# Patient Record
Sex: MALE
[De-identification: names, ages, dates, MRNs, and addresses within clinical notes are randomized per-mention and may not be internally consistent; named-entity substitution may affect disease eponyms.]

---

## 2017-06-11 ENCOUNTER — HOSPITAL (OUTPATIENT)
Dept: OTHER | Age: 22
End: 2017-06-11
Attending: EMERGENCY MEDICINE

## 2017-06-11 LAB
ALBUMIN SERPL-MCNC: 3.7 GM/DL (ref 3.6–5.1)
ALP SERPL-CCNC: 66 UNIT/L (ref 45–117)
ALT SERPL-CCNC: 24 UNIT/L
ANALYZER ANC (IANC): ABNORMAL
ANION GAP SERPL CALC-SCNC: 14 MMOL/L (ref 10–20)
AST SERPL-CCNC: 26 UNIT/L
BASOPHILS # BLD: 0 THOUSAND/MCL (ref 0–0.3)
BASOPHILS NFR BLD: 0 %
BILIRUB CONJ SERPL-MCNC: 0.1 MG/DL (ref 0–0.2)
BILIRUB SERPL-MCNC: 0.6 MG/DL (ref 0.2–1)
BUN SERPL-MCNC: 18 MG/DL (ref 6–20)
BUN/CREAT SERPL: 16 (ref 7–25)
CALCIUM SERPL-MCNC: 8.7 MG/DL (ref 8.4–10.2)
CHLORIDE: 106 MMOL/L (ref 98–107)
CO2 SERPL-SCNC: 27 MMOL/L (ref 21–32)
CREAT SERPL-MCNC: 1.11 MG/DL (ref 0.67–1.17)
DIFFERENTIAL METHOD BLD: ABNORMAL
EOSINOPHIL # BLD: 0.2 THOUSAND/MCL (ref 0.1–0.5)
EOSINOPHIL NFR BLD: 2 %
ERYTHROCYTE [DISTWIDTH] IN BLOOD: 12.9 % (ref 11–15)
ETHANOL SERPL-MCNC: NORMAL MG/DL
GLUCOSE SERPL-MCNC: 92 MG/DL (ref 65–99)
HEMATOCRIT: 40.5 % (ref 39–51)
HGB BLD-MCNC: 13.6 GM/DL (ref 13–17)
LIPASE SERPL-CCNC: 84 UNIT/L (ref 73–393)
LYMPHOCYTES # BLD: 3.9 THOUSAND/MCL (ref 1–4.8)
LYMPHOCYTES NFR BLD: 41 %
MAGNESIUM SERPL-MCNC: 1.8 MG/DL (ref 1.7–2.4)
MCH RBC QN AUTO: 30.5 PG (ref 26–34)
MCHC RBC AUTO-ENTMCNC: 33.6 GM/DL (ref 32–36.5)
MCV RBC AUTO: 90.8 FL (ref 78–100)
MONOCYTES # BLD: 0.8 THOUSAND/MCL (ref 0.3–0.9)
MONOCYTES NFR BLD: 8 %
NEUTROPHILS # BLD: 4.7 THOUSAND/MCL (ref 1.8–7.7)
NEUTROPHILS NFR BLD: 49 %
NEUTS SEG NFR BLD: ABNORMAL %
PERCENT NRBC: ABNORMAL
PLATELET # BLD: 240 THOUSAND/MCL (ref 140–450)
POTASSIUM SERPL-SCNC: 3.8 MMOL/L (ref 3.4–5.1)
PROT SERPL-MCNC: 6.9 GM/DL (ref 6.4–8.2)
RBC # BLD: 4.46 MILLION/MCL (ref 4.5–5.9)
SODIUM SERPL-SCNC: 143 MMOL/L (ref 135–145)
WBC # BLD: 9.6 THOUSAND/MCL (ref 4.2–11)

## 2020-01-11 ENCOUNTER — HOSPITAL ENCOUNTER (INPATIENT)
Facility: HOSPITAL | Age: 25
LOS: 1 days | Discharge: HOME | DRG: 540 | End: 2020-01-12
Attending: EMERGENCY MEDICINE | Admitting: HOSPITALIST

## 2020-01-11 DIAGNOSIS — M46.24 ACUTE OSTEOMYELITIS OF THORACIC SPINE (HCC): ICD-10-CM

## 2020-01-11 DIAGNOSIS — A49.01 STAPH AUREUS INFECTION: ICD-10-CM

## 2020-01-11 DIAGNOSIS — M46.22 ACUTE OSTEOMYELITIS OF CERVICAL SPINE (HCC): Primary | ICD-10-CM

## 2020-01-11 LAB
ALBUMIN SERPL-MCNC: 4.3 G/DL (ref 3.4–5)
ALBUMIN/GLOB SERPL: 1.2 {RATIO} (ref 1–2)
ALP LIVER SERPL-CCNC: 70 U/L (ref 45–117)
ALT SERPL-CCNC: 16 U/L (ref 16–61)
ANION GAP SERPL CALC-SCNC: 5 MMOL/L (ref 0–18)
AST SERPL-CCNC: 15 U/L (ref 15–37)
BASOPHILS # BLD AUTO: 0.04 X10(3) UL (ref 0–0.2)
BASOPHILS NFR BLD AUTO: 0.5 %
BILIRUB SERPL-MCNC: 0.7 MG/DL (ref 0.1–2)
BUN BLD-MCNC: 17 MG/DL (ref 7–18)
BUN/CREAT SERPL: 17 (ref 10–20)
CALCIUM BLD-MCNC: 9 MG/DL (ref 8.5–10.1)
CHLORIDE SERPL-SCNC: 111 MMOL/L (ref 98–112)
CO2 SERPL-SCNC: 25 MMOL/L (ref 21–32)
CREAT BLD-MCNC: 1 MG/DL (ref 0.7–1.3)
CRP SERPL-MCNC: <0.29 MG/DL (ref ?–0.3)
DEPRECATED RDW RBC AUTO: 44.7 FL (ref 35.1–46.3)
EOSINOPHIL # BLD AUTO: 0.03 X10(3) UL (ref 0–0.7)
EOSINOPHIL NFR BLD AUTO: 0.4 %
ERYTHROCYTE [DISTWIDTH] IN BLOOD BY AUTOMATED COUNT: 13.3 % (ref 11–15)
GLOBULIN PLAS-MCNC: 3.7 G/DL (ref 2.8–4.4)
GLUCOSE BLD-MCNC: 96 MG/DL (ref 70–99)
HCT VFR BLD AUTO: 43.6 % (ref 39–53)
HGB BLD-MCNC: 14.3 G/DL (ref 13–17.5)
IMM GRANULOCYTES # BLD AUTO: 0.02 X10(3) UL (ref 0–1)
IMM GRANULOCYTES NFR BLD: 0.2 %
LYMPHOCYTES # BLD AUTO: 1.89 X10(3) UL (ref 1–4)
LYMPHOCYTES NFR BLD AUTO: 22.9 %
M PROTEIN MFR SERPL ELPH: 8 G/DL (ref 6.4–8.2)
MCH RBC QN AUTO: 29.9 PG (ref 26–34)
MCHC RBC AUTO-ENTMCNC: 32.8 G/DL (ref 31–37)
MCV RBC AUTO: 91.2 FL (ref 80–100)
MONOCYTES # BLD AUTO: 0.6 X10(3) UL (ref 0.1–1)
MONOCYTES NFR BLD AUTO: 7.3 %
NEUTROPHILS # BLD AUTO: 5.69 X10 (3) UL (ref 1.5–7.7)
NEUTROPHILS # BLD AUTO: 5.69 X10(3) UL (ref 1.5–7.7)
NEUTROPHILS NFR BLD AUTO: 68.7 %
OSMOLALITY SERPL CALC.SUM OF ELEC: 293 MOSM/KG (ref 275–295)
PLATELET # BLD AUTO: 273 10(3)UL (ref 150–450)
POTASSIUM SERPL-SCNC: 4.1 MMOL/L (ref 3.5–5.1)
RBC # BLD AUTO: 4.78 X10(6)UL (ref 4.3–5.7)
SED RATE-ML: 3 MM/HR (ref 0–12)
SODIUM SERPL-SCNC: 141 MMOL/L (ref 136–145)
WBC # BLD AUTO: 8.3 X10(3) UL (ref 4–11)

## 2020-01-11 PROCEDURE — 99222 1ST HOSP IP/OBS MODERATE 55: CPT | Performed by: HOSPITALIST

## 2020-01-11 RX ORDER — ONDANSETRON 2 MG/ML
4 INJECTION INTRAMUSCULAR; INTRAVENOUS EVERY 6 HOURS PRN
Status: DISCONTINUED | OUTPATIENT
Start: 2020-01-11 | End: 2020-01-12

## 2020-01-11 RX ORDER — ENOXAPARIN SODIUM 100 MG/ML
40 INJECTION SUBCUTANEOUS DAILY
Status: DISCONTINUED | OUTPATIENT
Start: 2020-01-11 | End: 2020-01-12

## 2020-01-11 RX ORDER — LORAZEPAM 0.5 MG/1
0.5 TABLET ORAL EVERY 4 HOURS PRN
Status: DISCONTINUED | OUTPATIENT
Start: 2020-01-11 | End: 2020-01-12

## 2020-01-11 RX ORDER — METOCLOPRAMIDE HYDROCHLORIDE 5 MG/ML
5 INJECTION INTRAMUSCULAR; INTRAVENOUS EVERY 8 HOURS PRN
Status: DISCONTINUED | OUTPATIENT
Start: 2020-01-11 | End: 2020-01-12

## 2020-01-11 RX ORDER — CEFAZOLIN SODIUM/WATER 2 G/20 ML
2 SYRINGE (ML) INTRAVENOUS EVERY 8 HOURS
Status: DISCONTINUED | OUTPATIENT
Start: 2020-01-11 | End: 2020-01-12

## 2020-01-11 RX ORDER — ACETAMINOPHEN 325 MG/1
650 TABLET ORAL EVERY 6 HOURS PRN
Status: DISCONTINUED | OUTPATIENT
Start: 2020-01-11 | End: 2020-01-12

## 2020-01-11 NOTE — CM/SW NOTE
30year old male with a past medical history of IVDU with liquid cocaine. He was in [facility] due to what started as a right arm cellulitis but progressed to bacteremia/sepsis. He was initially placed on Ancef.   He was found to have spinal osteo and was ch

## 2020-01-11 NOTE — PLAN OF CARE
Vitals stable, afebrile, declines pain medication, scar/keloid to [facility], up ad luis, refuses SCD/Lovenox - Dr. Jesenia Weiner aware.       Plan:  Awaiting records from previous hospital (see previous note), possible d/c with IV Abx (Dalbavancin) - weekly infusions

## 2020-01-11 NOTE — CONSULTS
INFECTIOUS DISEASE CONSULTATION    Charlene Celso Patient Status:  Inpatient    1995 MRN [id number]   [facility] 3SW-A Attending Ronaldo Barrett MD   Hosp Day # 0 PCP None Pc g, 2 g, Intravenous, Q8H  •  Enoxaparin Sodium (LOVENOX) 40 MG/0.4ML injection 40 mg, 40 mg, Subcutaneous, Daily  No current facility-administered medications on file prior to encounter. No current outpatient medications on file prior to encounter. is normal cw resolving infection    -would favor completing full 8 weeks of abx  Will dw sw if can discharge on once weekly dalbavancin, since he is not a candidate for PICC Line  -await outside records        Lacy Fleming MD  [address]

## 2020-01-11 NOTE — ED INITIAL ASSESSMENT (HPI)
Pt presents to ED with complaint of back and neck pain. Pt reports he is being treated in [facility] for an aggressive staph infection \"turned to blood and bone infection. \" Pt reports he has been receiving IV antibiotics in the hospital for the last 5 weeks

## 2020-01-11 NOTE — PLAN OF CARE
Rates posterior cervical pain at 5/10, and declined Tylenol. Denies N/T to BUE. VSS. Denies nausea or chills. Up ad luis. Tolerating PO intake. Updated on POC. Call light within reach. Will continue to monitor.

## 2020-01-11 NOTE — ED PROVIDER NOTES
Patient Seen in: [facility] Emergency Department      History   Patient presents with:  Back Pain    Stated Complaint:     HPI  24-year-old male with a history of anxiety, history of IV drug abuse, history of methicillin sensitive staph infection, p Review of Systems    Positive for stated complaint:   Other systems are as noted in HPI. Constitutional and vital signs reviewed. All other systems reviewed and negative except as noted above.     Physical Exam     ED Triage Vitals [01/11/20 015 DIFFERENTIAL WITH PLATELET.   Procedure                               Abnormality         Status                     ---------                               -----------         ------                     CBC W/ DIFFERENTIAL[[id number]] infection    Disposition:  Admit  1/11/2020  2:50 am    Follow-up:  No follow-up provider specified. Medications Prescribed:  There are no discharge medications for this patient.                  Present on Admission  Date Reviewed: 12/12/2011

## 2020-01-11 NOTE — PROGRESS NOTES
NURSING ADMISSION NOTE      Patient admitted via cart from ED. Oriented to room. Safety precautions initiated. Bed in low position. Call light in reach.   Voided upon arrival.

## 2020-01-11 NOTE — PLAN OF CARE
[address] in hospitals, spoke with Chelsea Maldonado ([phone]) in Medical Records, she stated that they aren't usually open on weekends, but they are today and if I fax the request today she will fax the records to us.   Faxed th

## 2020-01-11 NOTE — H&P
[facility]  History and Physical     Reynold Verdin Patient Status:  Emergency    1995 MRN [id number]   Location [address] Attending Britta Castro MD   Hosp Day # 0 PCP None Pcp     Chief Complaint: hx of review of systems was completed. Pertinent positives and negatives noted in the HPI.     Physical Exam:    /79   Pulse 75   Temp 97.8 °F (36.6 °C) (Temporal)   Resp 18   Ht 5' 9\" (1.753 m)   Wt 165 lb (74.8 kg)   SpO2 98%   BMI 24.37 kg/m²   Gener Prophylaxis: lovenox  · CODE status: full  · [place]: no    Plan of care discussed with ED physician    Jayro Giraldo MD  1/11/2020

## 2020-01-11 NOTE — PROGRESS NOTES
IM addendum to Dr. Karrie Stewart H&P:    Pt seen and examined. No acute events, no new complaints.     /68 (BP Location: Right arm)   Pulse 75   Temp 97.7 °F (36.5 °C) (Oral)   Resp 20   Ht 5' 9\" (1.753 m)   Wt 165 lb (74.8 kg)   SpO2 97%   BMI 24.37 kg/

## 2020-01-11 NOTE — CM/SW NOTE
Patient will need close f/u with CM once needs are determined. Change healthcare to see patient for medicaid application. Pt. will likely need outpatient IV abx. Awaiting plan from ID.

## 2020-01-12 VITALS
RESPIRATION RATE: 16 BRPM | TEMPERATURE: 98 F | HEIGHT: 69 IN | HEART RATE: 87 BPM | WEIGHT: 165 LBS | DIASTOLIC BLOOD PRESSURE: 61 MMHG | BODY MASS INDEX: 24.44 KG/M2 | OXYGEN SATURATION: 97 % | SYSTOLIC BLOOD PRESSURE: 117 MMHG

## 2020-01-12 PROCEDURE — 99238 HOSP IP/OBS DSCHRG MGMT 30/<: CPT | Performed by: HOSPITALIST

## 2020-01-12 NOTE — CM/SW NOTE
12:00pm  MSW spoke to bedside Rn who states pt is ready for dc but needs to have 1x weekly IVABex set up. Patient is uninsured. CM On 1/11 sent orders to cancer center but did not speak to any staff. No Cancer Staff are working today.     MSW spoke to [address] [place] [address]

## 2020-01-12 NOTE — PROGRESS NOTES
[facility]                INFECTIOUS DISEASE PROGRESS NOTE    Yaya Sexton Patient Status:  Inpatient    1995 MRN [id number]   [facility] 3SW-A Attending Maura Chandler MD   Hosp Day # 1 PCP None Pcp     Antibioti showed resolution of paraspinal inflammation, but increased BM edema, cw osteomyelitis  CRP has normalized, and has completed 6 weeks ivabx  -plan to complete course of abx with Dalbavancin 1000mg today, [id number] q week x 2 more doses          Vesna Gallegos

## 2020-01-12 NOTE — PLAN OF CARE
Received pt at 0730. Pt axo, up to BRP. Arrangements made for infusion for the next 2 mondays for ABX infusion. All questions and concerns addressed, support given, will monitor. Pt will receive Dalbavancin prior to discharge.

## 2020-01-12 NOTE — PLAN OF CARE
Pt doing well, VSS, no pain meds given, up ad luis, voiding, IV ABX, questioned small \"yellow\" pinhole scab on scar at Rt AC wound where his infection started. Will turnover to days for MD to observe. All needs met, call light within reach, will CTM.

## 2020-01-14 ENCOUNTER — TELEPHONE (OUTPATIENT)
Dept: HEMATOLOGY/ONCOLOGY | Facility: HOSPITAL | Age: 25
End: 2020-01-14

## 2020-01-14 NOTE — TELEPHONE ENCOUNTER
Received call back from [facility] at Dr. Dominique Nick office. Order to start Dalvance on Monday 1/20Arletta Seats to contact patient.

## 2020-01-14 NOTE — CM/SW NOTE
Late entry 1/13/2020  luis manuel spoke to Mahogany Navarro at cancer center who confirms she is in receipt of all clinical information sent for weekly infusions for pt. She will coordinate further with Dr Uriah Olivarez office regarding IV/scheduling questions she has.  No additional

## 2020-01-20 ENCOUNTER — OFFICE VISIT (OUTPATIENT)
Dept: HEMATOLOGY/ONCOLOGY | Facility: HOSPITAL | Age: 25
End: 2020-01-20
Attending: INTERNAL MEDICINE

## 2020-01-20 VITALS
HEART RATE: 80 BPM | DIASTOLIC BLOOD PRESSURE: 64 MMHG | RESPIRATION RATE: 18 BRPM | OXYGEN SATURATION: 95 % | SYSTOLIC BLOOD PRESSURE: 117 MMHG | TEMPERATURE: 99 F

## 2020-01-20 DIAGNOSIS — M46.22 ACUTE OSTEOMYELITIS OF CERVICAL SPINE (HCC): Primary | ICD-10-CM

## 2020-01-20 DIAGNOSIS — M46.24 ACUTE OSTEOMYELITIS OF THORACIC SPINE (HCC): ICD-10-CM

## 2020-01-20 DIAGNOSIS — A49.01 STAPH AUREUS INFECTION: ICD-10-CM

## 2020-01-20 PROCEDURE — 96365 THER/PROPH/DIAG IV INF INIT: CPT

## 2020-01-20 NOTE — PROGRESS NOTES
Education Record    Learner:  Patient and Family Member    Disease / Diagnosis: staph infection/osteomyelitis to spine - dalvance infusion #1/2 total for outpatient. Received previously inpatient. Tolerated well without issue.  Already made next week's appt

## (undated) NOTE — MR AVS SNAPSHOT
After Visit Summary   1/20/2020    Marcelo Menjivar    MRN: [id number]           Visit Information     Date & Time  1/20/2020  1:00 PM Provider  [address]  Phone  [phone] Communicate with a [facility] Physician or LACHELLE online. The physician will respond and provide   a treatment plan within a few hours.  ONLINE VISIT  Primary Care Providers  Treatment for mild illness or injury that does not require immediate attention VIDEO VISITS